# Patient Record
(demographics unavailable — no encounter records)

---

## 2017-03-18 NOTE — EDM.PDOC
ED HPI GENERAL MEDICAL PROBLEM





- General


Stated Complaint: LACERATION LT INDEX FINGER


Time Seen by Provider: 03/18/17 20:34


Source of Information: Reports: Patient


History Limitations: Reports: No limitations





- History of Present Illness


INITIAL COMMENTS - FREE TEXT/NARRATIVE: 





HISTORY AND PHYSICAL:





History of present illness:


[] 37-year-old male with no significant past history now presents to the 

emergency department after laceration to the pad of his index finger. Patient's 

tetanus was more than 5 years ago. He tripped tried of present in the wall and 

was routed sticking out and it cut the tip of his finger per no other injury. 

Josephine blood loss by history





Review of systems: 


As per history of present illness and below otherwise all systems reviewed and 

negative.





Past medical history: 


As per history of present illness and as reviewed below otherwise 

noncontributory.





Surgical history: 


As per history of present illness and as reviewed below otherwise 

noncontributory.





Social history: 


No reported history of drug or alcohol abuse.





Family history: 


As per history of present illness and as reviewed below otherwise 

noncontributory.





Physical exam:


HEENT: Atraumatic, normocephalic, pupils reactive, negative for conjunctival 

pallor or scleral icterus, mucous membranes moist, throat clear, neck supple, 

nontender, trachea midline.


Lungs: Clear to auscultation, breath sounds equal bilaterally, chest nontender.


Heart: S1S2, regular, negative for clicks, rubs, or JVD.


Abdomen: Soft, nondistended, nontender. Negative for masses or 

hepatosplenomegaly. Negative for costovertebral tenderness.


Pelvis: Stable nontender.


Genitourinary: Deferred.


Rectal: Deferred.


Extremities: Atraumatic, negative for cords or calf pain. Neurovascular 

unremarkable.


Neuro: Awake, alert, oriented. Cranial nerves II through XII unremarkable. 

Cerebellum unremarkable. Motor and sensory unremarkable throughout. Exam 

nonfocal.





Diagnostics:


[]





Therapeutics:


[]





Impression: 


[]





Plan:


[]





Definitive disposition and diagnosis as appropriate pending reevaluation and 

review of above.





  ** Left 2-Index finger


Pain Score (Numeric/FACES): 3





- Related Data


 Allergies











Allergy/AdvReac Type Severity Reaction Status Date / Time


 


No Known Allergies Allergy   Verified 11/21/15 16:02











Home Meds: 


 Home Meds





. [No Known Home Meds]  03/18/17 [History]











Past Medical History





- Past Surgical History


Other Male  Surgeries/Procedures: vasectomy





Social & Family History





- Tobacco Use


Smoking Status *Q: Light Tobacco Smoker


Years of Tobacco use: 10


Packs/Tins Daily: 0.2





- Alcohol Use


Days Per Week of Alcohol Use: 5


Number of Drinks Per Day: 2


Total Drinks Per Week: 10





- Recreational Drug Use


Recreational Drug Use: No





ED ROS GENERAL





- Review of Systems


Review Of Systems: See Below (Per history of present illness)





ED EXAM, GENERAL





- Physical Exam


Exam: See Below (Per history of present illness)





Course





- Vital Signs


Text/Narrative:: 





This with laceration. Confined as a flap wound to the pad of his left index 

finger. 4.2 centimeter total length oriented in a flap.


Procedure: Repair of 4.2 cm laceration left index finger distal phalange palmar 

aspect. Digit anesthetized with 1% lidocaine with a digital block. Good 

anesthesia. Wound irrigated and repaired with 9 interrupted sutures of 5-0 

Prolene. Patient tolerated well no complications good approximation and 

hemostasis. Sterile dressing applied.


Patient aware to follow up with PCP for wound check in 2 days and suture 

removal in 10 days


Last Recorded V/S: 


 Last Vital Signs











Temp  36.8 C   03/18/17 22:07


 


Pulse  63   03/18/17 22:07


 


Resp  16   03/18/17 22:07


 


BP  127/81   03/18/17 22:07


 


Pulse Ox  98   03/18/17 22:07














- Orders/Labs/Meds


Orders: 


 Active Orders 24 hr











 Category Date Time Status


 


 Vaccines to be Administered [RC] PER UNIT ROUTINE Care  03/18/17 20:53 Active











Meds: 


Medications














Discontinued Medications














Generic Name Dose Route Start Last Admin





  Trade Name Taz  PRN Reason Stop Dose Admin


 


Bacitracin  1 dose  03/18/17 20:52  03/18/17 21:45





  Bacitracin Oint 1 Gm  TOP  03/18/17 20:53  1 dose





  ONETIME ONE   Administration


 


Diphtheria/Tetanus/Acell Pertussis  0.5 ml  03/18/17 20:52  03/18/17 21:46





  Adacel  IM  03/18/17 20:53  0.5 ml





  .ONCE ONE   Administration


 


Piperacillin Sod/Tazobactam  100 mls @ 100 mls/hr  03/18/17 20:35  03/18/17 21:

59





  Sod 4.5 gm/ Sodium Chloride  IV  03/18/17 21:34  Not Given





  ONETIME ONE   


 


Lidocaine HCl  20 ml  03/18/17 20:52  03/18/17 21:45





  Xylocaine 1%  INJECT  03/18/17 20:53  20 ml





  ONETIME ONE   Administration














Departure





- Departure


Time of Disposition: 21:42


Disposition: Home, Self-Care 01


Condition: good


Clinical Impression: 


 Laceration





Instructions:  Laceration Care, Adult


Referrals: 


PCP,None [Primary Care Provider] - 


Forms:  ED Department Discharge


Additional Instructions: 


Her laceration was repaired today with sutures that need to be removed in 10 

days. Follow up with your doctor for a wound check in 2 days. Watch for signs 

of infection worsening redness increasing swelling and pus drainage. If these 

occur return to the emergency department or see your doctor immediately for 

reevaluation and further treatment including antibiotics as needed. Motrin and 

Tylenol as needed for pain.





- My Orders


Last 24 Hours: 


My Active Orders





03/18/17 20:53


Vaccines to be Administered [RC] PER UNIT ROUTINE 














- Assessment/Plan


Last 24 Hours: 


My Active Orders





03/18/17 20:53


Vaccines to be Administered [RC] PER UNIT ROUTINE